# Patient Record
Sex: FEMALE | Race: ASIAN
[De-identification: names, ages, dates, MRNs, and addresses within clinical notes are randomized per-mention and may not be internally consistent; named-entity substitution may affect disease eponyms.]

---

## 2021-01-04 ENCOUNTER — HOSPITAL ENCOUNTER (INPATIENT)
Dept: HOSPITAL 54 - ER | Age: 76
LOS: 2 days | DRG: 137 | End: 2021-01-06
Attending: NURSE PRACTITIONER | Admitting: NURSE PRACTITIONER
Payer: COMMERCIAL

## 2021-01-04 VITALS — BODY MASS INDEX: 20.61 KG/M2 | WEIGHT: 112 LBS | HEIGHT: 62 IN

## 2021-01-04 DIAGNOSIS — E78.5: ICD-10-CM

## 2021-01-04 DIAGNOSIS — D63.1: ICD-10-CM

## 2021-01-04 DIAGNOSIS — I12.0: ICD-10-CM

## 2021-01-04 DIAGNOSIS — E87.70: ICD-10-CM

## 2021-01-04 DIAGNOSIS — J12.82: ICD-10-CM

## 2021-01-04 DIAGNOSIS — I16.0: ICD-10-CM

## 2021-01-04 DIAGNOSIS — E11.22: ICD-10-CM

## 2021-01-04 DIAGNOSIS — Z99.2: ICD-10-CM

## 2021-01-04 DIAGNOSIS — N18.6: ICD-10-CM

## 2021-01-04 DIAGNOSIS — N25.0: ICD-10-CM

## 2021-01-04 DIAGNOSIS — U07.1: Primary | ICD-10-CM

## 2021-01-04 PROCEDURE — U0003 INFECTIOUS AGENT DETECTION BY NUCLEIC ACID (DNA OR RNA); SEVERE ACUTE RESPIRATORY SYNDROME CORONAVIRUS 2 (SARS-COV-2) (CORONAVIRUS DISEASE [COVID-19]), AMPLIFIED PROBE TECHNIQUE, MAKING USE OF HIGH THROUGHPUT TECHNOLOGIES AS DESCRIBED BY CMS-2020-01-R: HCPCS

## 2021-01-04 PROCEDURE — C9803 HOPD COVID-19 SPEC COLLECT: HCPCS

## 2021-01-04 PROCEDURE — G0378 HOSPITAL OBSERVATION PER HR: HCPCS

## 2021-01-04 NOTE — NUR
[id number] FROM [facility] C/O GENERALIZED WEAKNESS AND LOSS OF APPETITE. PT 
MISSED DIALYSIS DUE TO THE HOLIDAY. PT PLACED IN BED, LINE ESTABLISHED RAC 20G, 
BLOOD DRAW, SENT TO LAB. PT ON MONITOR AND PULSE OX. AWAITING FOR MD AGUIRRE AND 
ORDERS. WILL CONTINUE TO MONITOR.

## 2021-01-05 VITALS — DIASTOLIC BLOOD PRESSURE: 65 MMHG | SYSTOLIC BLOOD PRESSURE: 161 MMHG

## 2021-01-05 VITALS — SYSTOLIC BLOOD PRESSURE: 106 MMHG | DIASTOLIC BLOOD PRESSURE: 54 MMHG

## 2021-01-05 LAB
ALBUMIN SERPL BCP-MCNC: 3.3 G/DL (ref 3.4–5)
ALP SERPL-CCNC: 77 U/L (ref 46–116)
ALT SERPL W P-5'-P-CCNC: 22 U/L (ref 12–78)
AST SERPL W P-5'-P-CCNC: 35 U/L (ref 15–37)
BASOPHILS # BLD AUTO: 0 /CMM (ref 0–0.2)
BASOPHILS NFR BLD AUTO: 0.2 % (ref 0–2)
BILIRUB DIRECT SERPL-MCNC: 0.1 MG/DL (ref 0–0.2)
BILIRUB SERPL-MCNC: 0.5 MG/DL (ref 0.2–1)
BUN SERPL-MCNC: 47 MG/DL (ref 7–18)
CALCIUM SERPL-MCNC: 7.7 MG/DL (ref 8.5–10.1)
CHLORIDE SERPL-SCNC: 99 MMOL/L (ref 98–107)
CO2 SERPL-SCNC: 23 MMOL/L (ref 21–32)
CREAT SERPL-MCNC: 7.6 MG/DL (ref 0.6–1.3)
EOSINOPHIL NFR BLD AUTO: 0 % (ref 0–6)
GLUCOSE SERPL-MCNC: 169 MG/DL (ref 74–106)
HCT VFR BLD AUTO: 37 % (ref 33–45)
HGB BLD-MCNC: 12.4 G/DL (ref 11.5–14.8)
LYMPHOCYTES NFR BLD AUTO: 0.4 /CMM (ref 0.8–4.8)
LYMPHOCYTES NFR BLD AUTO: 9.1 % (ref 20–44)
MCHC RBC AUTO-ENTMCNC: 33 G/DL (ref 31–36)
MCV RBC AUTO: 107 FL (ref 82–100)
MONOCYTES NFR BLD AUTO: 0.3 /CMM (ref 0.1–1.3)
MONOCYTES NFR BLD AUTO: 8 % (ref 2–12)
NEUTROPHILS # BLD AUTO: 3.3 /CMM (ref 1.8–8.9)
NEUTROPHILS NFR BLD AUTO: 82.7 % (ref 43–81)
PLATELET # BLD AUTO: 157 /CMM (ref 150–450)
POTASSIUM SERPL-SCNC: 4 MMOL/L (ref 3.5–5.1)
PROT SERPL-MCNC: 7.9 G/DL (ref 6.4–8.2)
RBC # BLD AUTO: 3.46 MIL/UL (ref 4–5.2)
SODIUM SERPL-SCNC: 135 MMOL/L (ref 136–145)
WBC NRBC COR # BLD AUTO: 4 K/UL (ref 4.3–11)

## 2021-01-05 RX ADMIN — DEXTROSE MONOHYDRATE SCH MLS/HR: 50 INJECTION, SOLUTION INTRAVENOUS at 05:50

## 2021-01-05 RX ADMIN — Medication SCH EACH: at 21:55

## 2021-01-05 RX ADMIN — HEPARIN SODIUM SCH UNITS: 5000 INJECTION INTRAVENOUS; SUBCUTANEOUS at 21:47

## 2021-01-05 RX ADMIN — HEPARIN SODIUM SCH UNITS: 5000 INJECTION INTRAVENOUS; SUBCUTANEOUS at 08:43

## 2021-01-05 NOTE — NUR
ATTEMPTED TO GIVE REPORT TWICE, NURSES ARE UNABLE TO TAKE REPORT. PATIENT 
TRANSFERRED TO ROOM 209-1 VIA ACLS PROTOCOL.

## 2021-01-05 NOTE — NUR
TELE/RN CLOSING NOTES



RECEIVED PATIENT IN BED RESTING. PATIENT ALERT AND ORIENTED X 4. PATIENT IN NO APPARENT 
RESPIRATORY DISTRESS NOTED. NO COMPLAINED OF PAIN AT THIS TIME. TELE MONITOR READING SINUS 
RHYTHM. IV ACCESS AT LEFT FOREARM # 18 G PATENT AND INTACT. SAFETY PRECAUTIONS WAS IN 
PLACED. BED IN LOWEST POSITION AND LOCKED. SIDERAILS UP X2. WILL CONTINUE TO MONITOR.

## 2021-01-05 NOTE — NUR
PT REMAINS ON ROOM AIR SAT 94-98%. NO SOB NOTED. PT DOES HAVE A HIGH BP, 
HYDRALAZINE PO GIVEN. WILL CONTINUE TO MONITOR PT.

## 2021-01-05 NOTE — NUR
TELE/RN NOTES

RECEIVED REPORT FROM MICHELE ER NURSE. PATIENT IS ALERT AND ORIENTED X4. PATIENT IN NO 
APPARENT RESPIRATORY DI TRESS NOTED. COMPLAINED OF PAIN RATED 9/10. INITIAL ASSESSMENT WAS 
DONE. WILL CONTINUE TO MONITOR.

## 2021-01-05 NOTE — NUR
TELE/RN CLOSING NOTES

PATIENT IS ON BED. ALERT AND ORIENTED X4. PATIENT IS ON ROOM AIR, SATURATION 95%. PATIENT IN 
NO APPARENT RESPIRATORY DISTRESS NOTED. NO COMPLAINED OF PAIN AT THIS TIME. TELE MONITOR 
READING SINUS RHYTHM 69 BPM.SEEN AND EXAMINED BY MD WITH ORDERS MADE AND CARRIED OUT. ALL 
DUE MEDICATIONS WAS GIVEN. IV ACCESS AT LEFT FOREARM # 18 G PATENT AND INTACT. SAFETY 
PRECAUTIONS WAS IN PLACED. BED IN LOWEST POSITION AND LOCKED. SIDERAILS UP X2. WILL ENDORSED 
TO NIGHT SHIFT FOR MARIE.

## 2021-01-06 VITALS — SYSTOLIC BLOOD PRESSURE: 132 MMHG | DIASTOLIC BLOOD PRESSURE: 56 MMHG

## 2021-01-06 VITALS — SYSTOLIC BLOOD PRESSURE: 112 MMHG | DIASTOLIC BLOOD PRESSURE: 60 MMHG

## 2021-01-06 PROCEDURE — 0BH18EZ INSERTION OF ENDOTRACHEAL AIRWAY INTO TRACHEA, VIA NATURAL OR ARTIFICIAL OPENING ENDOSCOPIC: ICD-10-PCS | Performed by: EMERGENCY MEDICINE

## 2021-01-06 PROCEDURE — 5A12012 PERFORMANCE OF CARDIAC OUTPUT, SINGLE, MANUAL: ICD-10-PCS | Performed by: EMERGENCY MEDICINE

## 2021-01-06 RX ADMIN — Medication SCH EACH: at 06:26

## 2021-01-06 RX ADMIN — HEPARIN SODIUM SCH UNITS: 5000 INJECTION INTRAVENOUS; SUBCUTANEOUS at 09:02

## 2021-01-06 RX ADMIN — DEXTROSE MONOHYDRATE SCH MLS/HR: 50 INJECTION, SOLUTION INTRAVENOUS at 05:04

## 2021-01-06 NOTE — NUR
RN Opening note



Received patient AO x 4, able to responds all stimuli. patient c/o nauseate and vomiting and 
given Zofran 4mg via IV, heparin and Decadron. Stable v.s: t-99.0, bp-149/86, p-85, r-20, 
O2sat 93% on room air. Skin is warm to touch keep clean/dry, intact IV site, respiratory 
even and unlabored on room air. Kept elevated HOB for ensure airway and aspiration 
precaution, also lowest bed position for safety. Call light within reach, will continue to 
monitor.

## 2021-01-06 NOTE — NUR
TELE/RN CLOSING NOTES



PATIENT IN BED RESTING. PATIENT ALERT AND ORIENTED X 4. PATIENT IN NO APPARENT RESPIRATORY 
DISTRESS NOTED. TELE MONITOR READING SINUS RHYTHM. IV ACCESS AT RIGHT AC PATENT AND INTACT. 
ALL PATIENTS NEEDS HAVE BEEN MET DURING SHIFT. SAFETY MEASURES ARE IN PLACE, CALL LIGHT IS 
WITHIN REACH, BED IS LOCKED AND PLACED IN THE LOWEST POSITION, SIDE RAILS UP X 2. WILL 
ENDORSE CARE TO DAY SHIFT NURSE.

## 2021-01-06 NOTE — NUR
Per CNA in to room 209-1 noticed patient unresponsive with pale skin color, paged code blue 
and started CPR. Patient terminated at 1008, informed DTR/Shila regarding above, DTR/Shila 
picked up belongings around 1224pm and signed on form belongings included gold color ring 
x1, denture upper/lower for each, cell  x 1, and clothes. Called [facility]/Leighann received Case 
#:[id number].